# Patient Record
Sex: MALE | Race: WHITE | NOT HISPANIC OR LATINO | Employment: OTHER | URBAN - METROPOLITAN AREA
[De-identification: names, ages, dates, MRNs, and addresses within clinical notes are randomized per-mention and may not be internally consistent; named-entity substitution may affect disease eponyms.]

---

## 2019-01-23 NOTE — PATIENT DISCUSSION
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 Longstanding, per Pt. No holes, breaks, or tears noted today. - Plan : Monitor for changes. Advised patient to call our office with any decreased vision, any increase in floaters, or seeing flashes, dark shadows, or a curtain/veil across vision.

## 2019-01-23 NOTE — PATIENT DISCUSSION
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 Likely rebound after decreasing Durezol to qdaily. Pt increased to tid OD. Pt reports original taper instructions were qid for 1 week, bid for 1 week, qdaily 1 week, then stop. - Plan : Continue Durezol OD tid for 1 week, then bid for 1 week, then qdaily for 1 week, then 1x every other day for 1 week, then stop. Call if symptoms worsen or has new symptoms or vision changes. Continue following with Dr nunu [facility] as scheduled. RTC here prn while in town.

## 2020-02-20 ENCOUNTER — NEW PATIENT EMERGENCY (OUTPATIENT)
Dept: URBAN - METROPOLITAN AREA CLINIC 36 | Facility: CLINIC | Age: 85
End: 2020-02-20

## 2020-02-20 DIAGNOSIS — H04.123: ICD-10-CM

## 2020-02-20 PROCEDURE — 99202 OFFICE O/P NEW SF 15 MIN: CPT

## 2020-02-20 ASSESSMENT — VISUAL ACUITY
OS_CC: 20/50
OD_CC: 20/50-2
OD_PH: 20/40-2

## 2020-02-20 ASSESSMENT — TONOMETRY
OD_IOP_MMHG: 16
OS_IOP_MMHG: 18